# Patient Record
Sex: MALE | Race: WHITE | Employment: UNEMPLOYED | URBAN - METROPOLITAN AREA
[De-identification: names, ages, dates, MRNs, and addresses within clinical notes are randomized per-mention and may not be internally consistent; named-entity substitution may affect disease eponyms.]

---

## 2022-08-09 ENCOUNTER — APPOINTMENT (OUTPATIENT)
Dept: CT IMAGING | Age: 37
End: 2022-08-09
Payer: MEDICAID

## 2022-08-09 ENCOUNTER — HOSPITAL ENCOUNTER (EMERGENCY)
Age: 37
Discharge: HOME | End: 2022-08-09
Payer: MEDICAID

## 2022-08-09 VITALS
SYSTOLIC BLOOD PRESSURE: 130 MMHG | BODY MASS INDEX: 21.77 KG/M2 | HEIGHT: 71 IN | TEMPERATURE: 98.6 F | WEIGHT: 155.5 LBS | OXYGEN SATURATION: 96 % | HEART RATE: 75 BPM | DIASTOLIC BLOOD PRESSURE: 85 MMHG | RESPIRATION RATE: 20 BRPM

## 2022-08-09 DIAGNOSIS — K62.89 MASS OF ANUS: Primary | ICD-10-CM

## 2022-08-09 DIAGNOSIS — K62.89 RECTAL PAIN: ICD-10-CM

## 2022-08-09 LAB
ALBUMIN SERPL-MCNC: 5 GM/DL (ref 3.4–5)
ALP BLD-CCNC: 85 IU/L (ref 40–129)
ALT SERPL-CCNC: 12 U/L (ref 10–40)
ANION GAP SERPL CALCULATED.3IONS-SCNC: 14 MMOL/L (ref 4–16)
AST SERPL-CCNC: 26 IU/L (ref 15–37)
BACTERIA: NEGATIVE /HPF
BASOPHILS ABSOLUTE: 0 K/CU MM
BASOPHILS RELATIVE PERCENT: 0.3 % (ref 0–1)
BILIRUB SERPL-MCNC: 0.5 MG/DL (ref 0–1)
BILIRUBIN URINE: NEGATIVE MG/DL
BLOOD, URINE: NEGATIVE
BUN BLDV-MCNC: 14 MG/DL (ref 6–23)
CALCIUM SERPL-MCNC: 9.4 MG/DL (ref 8.3–10.6)
CHLORIDE BLD-SCNC: 102 MMOL/L (ref 99–110)
CLARITY: CLEAR
CO2: 23 MMOL/L (ref 21–32)
COLOR: YELLOW
CREAT SERPL-MCNC: 1 MG/DL (ref 0.9–1.3)
DIFFERENTIAL TYPE: ABNORMAL
EOSINOPHILS ABSOLUTE: 0.2 K/CU MM
EOSINOPHILS RELATIVE PERCENT: 1.6 % (ref 0–3)
GFR AFRICAN AMERICAN: >60 ML/MIN/1.73M2
GFR NON-AFRICAN AMERICAN: >60 ML/MIN/1.73M2
GLUCOSE BLD-MCNC: 103 MG/DL (ref 70–99)
GLUCOSE, URINE: NEGATIVE MG/DL
HCT VFR BLD CALC: 50.4 % (ref 42–52)
HEMOGLOBIN: 16.9 GM/DL (ref 13.5–18)
IMMATURE NEUTROPHIL %: 0.2 % (ref 0–0.43)
KETONES, URINE: NEGATIVE MG/DL
LEUKOCYTE ESTERASE, URINE: NEGATIVE
LIPASE: 19 IU/L (ref 13–60)
LYMPHOCYTES ABSOLUTE: 2 K/CU MM
LYMPHOCYTES RELATIVE PERCENT: 20.6 % (ref 24–44)
MCH RBC QN AUTO: 29.6 PG (ref 27–31)
MCHC RBC AUTO-ENTMCNC: 33.5 % (ref 32–36)
MCV RBC AUTO: 88.4 FL (ref 78–100)
MONOCYTES ABSOLUTE: 1 K/CU MM
MONOCYTES RELATIVE PERCENT: 10.9 % (ref 0–4)
MUCUS: ABNORMAL HPF
NITRITE URINE, QUANTITATIVE: NEGATIVE
NUCLEATED RBC %: 0 %
PDW BLD-RTO: 12.7 % (ref 11.7–14.9)
PH, URINE: 5.5 (ref 5–8)
PLATELET # BLD: 283 K/CU MM (ref 140–440)
PMV BLD AUTO: 9.2 FL (ref 7.5–11.1)
POTASSIUM SERPL-SCNC: 4.5 MMOL/L (ref 3.5–5.1)
PROTEIN UA: NEGATIVE MG/DL
RBC # BLD: 5.7 M/CU MM (ref 4.6–6.2)
RBC URINE: <1 /HPF (ref 0–3)
SEGMENTED NEUTROPHILS ABSOLUTE COUNT: 6.3 K/CU MM
SEGMENTED NEUTROPHILS RELATIVE PERCENT: 66.4 % (ref 36–66)
SODIUM BLD-SCNC: 139 MMOL/L (ref 135–145)
SPECIFIC GRAVITY UA: 1.02 (ref 1–1.03)
SQUAMOUS EPITHELIAL: <1 /HPF
TOTAL IMMATURE NEUTOROPHIL: 0.02 K/CU MM
TOTAL NUCLEATED RBC: 0 K/CU MM
TOTAL PROTEIN: 8.3 GM/DL (ref 6.4–8.2)
TRICHOMONAS: ABNORMAL /HPF
UROBILINOGEN, URINE: 0.2 MG/DL (ref 0.2–1)
WBC # BLD: 9.5 K/CU MM (ref 4–10.5)
WBC UA: 1 /HPF (ref 0–2)

## 2022-08-09 PROCEDURE — 85025 COMPLETE CBC W/AUTO DIFF WBC: CPT

## 2022-08-09 PROCEDURE — 80053 COMPREHEN METABOLIC PANEL: CPT

## 2022-08-09 PROCEDURE — 6360000004 HC RX CONTRAST MEDICATION: Performed by: PHYSICIAN ASSISTANT

## 2022-08-09 PROCEDURE — 74177 CT ABD & PELVIS W/CONTRAST: CPT

## 2022-08-09 PROCEDURE — 83690 ASSAY OF LIPASE: CPT

## 2022-08-09 PROCEDURE — 81001 URINALYSIS AUTO W/SCOPE: CPT

## 2022-08-09 PROCEDURE — 99285 EMERGENCY DEPT VISIT HI MDM: CPT

## 2022-08-09 RX ORDER — AMOXICILLIN 250 MG
1 CAPSULE ORAL DAILY
Qty: 30 TABLET | Refills: 0 | Status: SHIPPED | OUTPATIENT
Start: 2022-08-09

## 2022-08-09 RX ORDER — HYDROCORTISONE 25 MG/G
CREAM TOPICAL
Qty: 1 EACH | Refills: 0 | Status: SHIPPED | OUTPATIENT
Start: 2022-08-09

## 2022-08-09 RX ADMIN — IOPAMIDOL 75 ML: 755 INJECTION, SOLUTION INTRAVENOUS at 16:50

## 2022-08-09 ASSESSMENT — PAIN SCALES - GENERAL: PAINLEVEL_OUTOF10: 10

## 2022-08-09 ASSESSMENT — PAIN DESCRIPTION - PAIN TYPE: TYPE: ACUTE PAIN

## 2022-08-09 ASSESSMENT — PAIN DESCRIPTION - LOCATION: LOCATION: RECTUM

## 2022-08-09 ASSESSMENT — PAIN DESCRIPTION - DESCRIPTORS: DESCRIPTORS: SHARP;SHOOTING;THROBBING

## 2022-08-09 ASSESSMENT — PAIN DESCRIPTION - FREQUENCY: FREQUENCY: CONTINUOUS

## 2022-08-09 NOTE — ED PROVIDER NOTES
8/9/2022 4:07 PM EDT  Vera La was checked out to me by Steele Carrel PA-C. Please see his/her initial documentation for details of the patient's ED presentation, physical exam and completed studies. In brief, Vera La presented for rectal pain/mass. Per initial ED provider, patient is asymptomatic for the last 4 to 5 days, unable to sit secondary to pain. No previous history of hemorrhoids, continues to have bowel movements but with discomfort. Denying any bleeding from the rectum. Does state that he was recent incarcerated but denies any history of anal sexual intercourse. No history of diabetes. Has not noted any fever or chills. No scrotal or testicular pain or swelling. No urinary complaints. Rectal exam was performed by initial ED provider which did show a large rectal mass from the anal area without evidence of prolapse, is exquisitely tender without active bleeding. Focused physical exam: Nontoxic afebrile, ambulating and eating without difficulty. On chaperoned rectal exam, there is a large raised nearly circumferential erythematous lesion to the outer anus, exquisitely tender without active bleeding or point drainage. Areas appear flesh-colored pedunculated along the margin with more ulcerative central appearance. No palpable crepitus. No involvement of the gluteal cleft, perineum or scrotal area. No visible clot or thrombosis to the lesion.     I have reviewed and interpreted all of the currently available lab results from this visit (if applicable):  Results for orders placed or performed during the hospital encounter of 08/09/22   CBC with Auto Differential   Result Value Ref Range    WBC 9.5 4.0 - 10.5 K/CU MM    RBC 5.70 4.6 - 6.2 M/CU MM    Hemoglobin 16.9 13.5 - 18.0 GM/DL    Hematocrit 50.4 42 - 52 %    MCV 88.4 78 - 100 FL    MCH 29.6 27 - 31 PG    MCHC 33.5 32.0 - 36.0 %    RDW 12.7 11.7 - 14.9 %    Platelets 998 616 - 811 K/CU MM    MPV 9.2 7.5 - 11.1 FL Differential Type AUTOMATED DIFFERENTIAL     Segs Relative 66.4 (H) 36 - 66 %    Lymphocytes % 20.6 (L) 24 - 44 %    Monocytes % 10.9 (H) 0 - 4 %    Eosinophils % 1.6 0 - 3 %    Basophils % 0.3 0 - 1 %    Segs Absolute 6.3 K/CU MM    Lymphocytes Absolute 2.0 K/CU MM    Monocytes Absolute 1.0 K/CU MM    Eosinophils Absolute 0.2 K/CU MM    Basophils Absolute 0.0 K/CU MM    Nucleated RBC % 0.0 %    Total Nucleated RBC 0.0 K/CU MM    Total Immature Neutrophil 0.02 K/CU MM    Immature Neutrophil % 0.2 0 - 0.43 %   Comprehensive Metabolic Panel   Result Value Ref Range    Sodium 139 135 - 145 MMOL/L    Potassium 4.5 3.5 - 5.1 MMOL/L    Chloride 102 99 - 110 mMol/L    CO2 23 21 - 32 MMOL/L    BUN 14 6 - 23 MG/DL    Creatinine 1.0 0.9 - 1.3 MG/DL    Glucose 103 (H) 70 - 99 MG/DL    Calcium 9.4 8.3 - 10.6 MG/DL    Albumin 5.0 3.4 - 5.0 GM/DL    Total Protein 8.3 (H) 6.4 - 8.2 GM/DL    Total Bilirubin 0.5 0.0 - 1.0 MG/DL    ALT 12 10 - 40 U/L    AST 26 15 - 37 IU/L    Alkaline Phosphatase 85 40 - 129 IU/L    GFR Non-African American >60 >60 mL/min/1.73m2    GFR African American >60 >60 mL/min/1.73m2    Anion Gap 14 4 - 16   Lipase   Result Value Ref Range    Lipase 19 13 - 60 IU/L   Urinalysis with Microscopic   Result Value Ref Range    Color, UA YELLOW YELLOW    Clarity, UA CLEAR CLEAR    Glucose, Urine NEGATIVE NEGATIVE MG/DL    Bilirubin Urine NEGATIVE NEGATIVE MG/DL    Ketones, Urine NEGATIVE NEGATIVE MG/DL    Specific Gravity, UA 1.025 1.001 - 1.035    Blood, Urine NEGATIVE NEGATIVE    pH, Urine 5.5 5.0 - 8.0    Protein, UA NEGATIVE NEGATIVE MG/DL    Urobilinogen, Urine 0.2 0.2 - 1.0 MG/DL    Nitrite Urine, Quantitative NEGATIVE NEGATIVE    Leukocyte Esterase, Urine NEGATIVE NEGATIVE    RBC, UA <1 0 - 3 /HPF    WBC, UA 1 0 - 2 /HPF    Bacteria, UA NEGATIVE NEGATIVE /HPF    Squam Epithel, UA <1 /HPF    Mucus, UA MODERATE (A) NEGATIVE HPF    Trichomonas, UA NONE SEEN NONE SEEN /HPF       LABS:  Results for orders placed or performed during the hospital encounter of 08/09/22   CBC with Auto Differential   Result Value Ref Range    WBC 9.5 4.0 - 10.5 K/CU MM    RBC 5.70 4.6 - 6.2 M/CU MM    Hemoglobin 16.9 13.5 - 18.0 GM/DL    Hematocrit 50.4 42 - 52 %    MCV 88.4 78 - 100 FL    MCH 29.6 27 - 31 PG    MCHC 33.5 32.0 - 36.0 %    RDW 12.7 11.7 - 14.9 %    Platelets 942 337 - 255 K/CU MM    MPV 9.2 7.5 - 11.1 FL    Differential Type AUTOMATED DIFFERENTIAL     Segs Relative 66.4 (H) 36 - 66 %    Lymphocytes % 20.6 (L) 24 - 44 %    Monocytes % 10.9 (H) 0 - 4 %    Eosinophils % 1.6 0 - 3 %    Basophils % 0.3 0 - 1 %    Segs Absolute 6.3 K/CU MM    Lymphocytes Absolute 2.0 K/CU MM    Monocytes Absolute 1.0 K/CU MM    Eosinophils Absolute 0.2 K/CU MM    Basophils Absolute 0.0 K/CU MM    Nucleated RBC % 0.0 %    Total Nucleated RBC 0.0 K/CU MM    Total Immature Neutrophil 0.02 K/CU MM    Immature Neutrophil % 0.2 0 - 0.43 %   Comprehensive Metabolic Panel   Result Value Ref Range    Sodium 139 135 - 145 MMOL/L    Potassium 4.5 3.5 - 5.1 MMOL/L    Chloride 102 99 - 110 mMol/L    CO2 23 21 - 32 MMOL/L    BUN 14 6 - 23 MG/DL    Creatinine 1.0 0.9 - 1.3 MG/DL    Glucose 103 (H) 70 - 99 MG/DL    Calcium 9.4 8.3 - 10.6 MG/DL    Albumin 5.0 3.4 - 5.0 GM/DL    Total Protein 8.3 (H) 6.4 - 8.2 GM/DL    Total Bilirubin 0.5 0.0 - 1.0 MG/DL    ALT 12 10 - 40 U/L    AST 26 15 - 37 IU/L    Alkaline Phosphatase 85 40 - 129 IU/L    GFR Non-African American >60 >60 mL/min/1.73m2    GFR African American >60 >60 mL/min/1.73m2    Anion Gap 14 4 - 16   Lipase   Result Value Ref Range    Lipase 19 13 - 60 IU/L   Urinalysis with Microscopic   Result Value Ref Range    Color, UA YELLOW YELLOW    Clarity, UA CLEAR CLEAR    Glucose, Urine NEGATIVE NEGATIVE MG/DL    Bilirubin Urine NEGATIVE NEGATIVE MG/DL    Ketones, Urine NEGATIVE NEGATIVE MG/DL    Specific Gravity, UA 1.025 1.001 - 1.035    Blood, Urine NEGATIVE NEGATIVE    pH, Urine 5.5 5.0 - 8.0    Protein, UA NEGATIVE NEGATIVE MG/DL    Urobilinogen, Urine 0.2 0.2 - 1.0 MG/DL    Nitrite Urine, Quantitative NEGATIVE NEGATIVE    Leukocyte Esterase, Urine NEGATIVE NEGATIVE    RBC, UA <1 0 - 3 /HPF    WBC, UA 1 0 - 2 /HPF    Bacteria, UA NEGATIVE NEGATIVE /HPF    Squam Epithel, UA <1 /HPF    Mucus, UA MODERATE (A) NEGATIVE HPF    Trichomonas, UA NONE SEEN NONE SEEN /HPF         IMAGING:          CT ABDOMEN PELVIS W IV CONTRAST Additional Contrast? None (Final result)  Result time 08/09/22 17:24:50  Final result by Chevy Henson MD (08/09/22 17:24:50)                Impression:    No acute abnormality in the abdomen or pelvis. Narrative:    EXAMINATION:   CT OF THE ABDOMEN AND PELVIS WITH CONTRAST 8/9/2022 4:49 pm     TECHNIQUE:   CT of the abdomen and pelvis was performed with the administration of   intravenous contrast. Multiplanar reformatted images are provided for review. Automated exposure control, iterative reconstruction, and/or weight based   adjustment of the mA/kV was utilized to reduce the radiation dose to as low   as reasonably achievable. COMPARISON:   None. HISTORY:   ORDERING SYSTEM PROVIDED HISTORY: Abdominal pain, rectal mass. TECHNOLOGIST PROVIDED HISTORY:   Reason for exam:->Abdominal pain, rectal mass. Additional Contrast?->None   Decision Support Exception - unselect if not a suspected or confirmed   emergency medical condition->Emergency Medical Condition (MA)   Reason for Exam: Abdominal pain, rectal mass. FINDINGS:   Lower Chest: Limited images through the lung bases are unremarkable. Organs: The liver and gallbladder are unremarkable. No biliary ductal   dilatation is identified. The pancreas, spleen, and bilateral adrenal glands   are unremarkable. The bilateral kidneys and ureters are unremarkable. GI/Bowel: Normal appendix. The colon is unremarkable. The stomach and small   bowel are normal in appearance. No obstruction or wall thickening identified. Pelvis: The urinary bladder is normal in appearance. The prostate gland is   normal in appearance. No free fluid. No pelvic or inguinal lymphadenopathy. Peritoneum/Retroperitoneum: The abdominal aorta is normal in appearance. No   retroperitoneal or mesenteric lymphadenopathy is identified. No free air or   fluid is seen in the abdomen. Bones/Soft Tissues: Bilateral sacroiliitis. Chronic bilateral L5 pars   defects with grade 1 anterolisthesis of L5 on S1 and moderate L5-S1   degenerative disc disease. No acute osseous or soft tissue abnormality. FINAL IMPRESSION:  1. Mass of anus    2. Rectal pain        Patient was signed out to me by colleague, Lula Martinez PA-C, pending lab results, CT imaging and anticipated discharge home with symptomatic care and general surgery follow-up. Please see his/her note for complete HPI/physical exam and initial interpretation of completed labs/imaging studies. Labs are reassuring. Normal white count hemoglobin. CMP without significant derangement. Lipase within normal range. CT abdomen pelvis with IV contrast is negative for evidence of acute abdominal pelvic process or evidence of perirectal/perianal abscess or cellulitis. On my chaperoned rectal exam, there is a large raised erythematous lesion nearly circumferential to the outer anal area. More pedunculated flesh-colored to the margins however central aspect does appear more erythematous and ulcerative. No active bleeding. Areas are exquisitely tender without fluctuance or crepitus. No involvement of perineum or gluteal cleft. At this time, unclear exact etiology for outer anal mass. Possibly a large external hemorrhoid that does not appear acutely thrombosed at this time. Also discussed possible other abnormal skin lesion including out of the warts but cannot completely rule out possible malignant lesion.   We discussed supportive symptomatic care, improved bowel regimen and close follow-up with general surgeon for further evaluation. Patient is comfortable agreeable this plan. No evidence of bacterial process requiring antibiotics and no palpable abscess requiring I&D at this time. Patient is discharged in stable condition with Steven Sheehan. Educate on sitz bath's. Educated to avoid prolonged sitting or straining on the toilet, increasing fiber intake. Patient does not have PCP so I have given him/her doctor of the day contact information and encourage him/her to establish care and followup in the next 1-2 days. Will also be given follow-up with on-call general surgeon. Return warnings discussed.         (Please note that portions of this note may have been completed with a voice recognition program. Efforts were made to edit the dictations but occasionally words are mis-transcribed.)       Jayden Busby PA-C  08/09/22 2006

## 2022-08-09 NOTE — ED NOTES
Pt notified of need for urine sample - states he just went to the bathroom right before I walked in. States he will try again in a minute to obtain sample.       Tate Stinson RN  08/09/22 [id number]

## 2022-08-09 NOTE — ED PROVIDER NOTES
[address] ENCOUNTER        Pt Name: Dustin Cooper  MRN: [id number]  [place] 1985  Date of evaluation: 8/9/2022  Provider: BERT Jacobson  PCP: No primary care provider on file. JOMAR. I have evaluated this patient. My supervising physician was available for consultation. Triage CHIEF COMPLAINT       Chief Complaint   Patient presents with    Hemorrhoids     States he has had hemorrhoid pain for the last 5 days         HISTORY OF PRESENT ILLNESS      Chief Complaint: Hemorrhoids/rectal pain    Dustni Cooper is a 39 y.o. male who presents to the emergency department today with a rectal mass/rectal pain. It is developed over the last 4 to 5 days. States he got to the point where he is unable to sit. He states he is never had history of significant hemorrhoids. He is concerned that there may be something else going on. He states is exquisitely tender. He is having bowel movements but very minimal.  He says he has occasional upper abdominal pain. He has no obvious bleeding from the rectum. He has no other significant medical history. States he has been incarcerated, he denies history of anal sexual intercourse. No other significant surgical and/or medical issues. Unclear of his family history    Nursing Notes were all reviewed and agreed with or any disagreements were addressed in the HPI.     REVIEW OF SYSTEMS     Constitutional:   Denies fever, chills, weight loss or weakness   HENT:   Denies sore throat or ear pain   Cardiovascular:   Denies chest pain, palpitations   Respiratory:  Denies cough or shortness of breath    GI: See HPI  :  Denies any urinary symptoms   Musculoskeletal:   Denies back pain  Skin:   Denies rash  Neurologic:   Denies headache, focal weakness or sensory changes   Endocrine:  Denies polyuria or polydypsia   Lymphatic:  Denies swollen glands     PAST MEDICAL intact. Normal gross motor coordination & motor strength bilateral upper and lower extremities  Sensation intact. Psychiatric:  Affect normal, Mood normal.     DIAGNOSTIC RESULTS   LABS:    Labs Reviewed   CBC WITH AUTO DIFFERENTIAL   COMPREHENSIVE METABOLIC PANEL   LIPASE   URINALYSIS WITH MICROSCOPIC       When ordered, only abnormal lab results are displayed. All other labs were within normal range or not returned as of this dictation. EKG: When ordered, EKG's are interpreted by the Emergency Department Physician in the absence of a cardiologist.  Please see their note for interpretation of EKG. RADIOLOGY:   Non-plain film images such as CT, Ultrasound and MRI are read by the radiologist. Plain radiographic images are visualized and preliminarily interpreted by the  ED Provider with the below findings:    Interpretation perthe Radiologist below, if available at the time of this note:    CT ABDOMEN PELVIS W IV CONTRAST Additional Contrast? None    (Results Pending)     No results found. PROCEDURES   Unless otherwise noted below, none       CRITICAL CARE   CRITICAL CARE NOTE:   N/A    CONSULTS:  None      EMERGENCY DEPARTMENT COURSE and MDM:   Vitals:    Vitals:    08/09/22 1436   BP: (!) 132/100   Pulse: 82   Resp: 16   Temp: 98.6 °F (37 °C)   TempSrc: Oral   Weight: 155 lb 8 oz (70.5 kg)   Height: 5' 11\" (1.803 m)       Patient was given thefollowing medications:  Medications - No data to display      Is this patient to be included in the SEP-1 Core Measure due to severe sepsis or septic shock? No   Exclusion criteria - the patient is NOT to be included for SEP-1 Core Measure due to: Infection is not suspected    MDM:  Patient presents as above. Emergent etiologies considered. Patient seen and examined. Work-up initiated secondary to presentation, physical exam findings, vital signs and medical chart review.     In brief, 51-year-old male presenting to the emergency department today with a rectal mass/rectal pain. Thinks that it may be hemorrhoids but he is unsure. He feels like there something else going on. He is having no obvious bleeding, he is exquisitely tender. No other rash. Having trouble with bowel movements. On physical exam there is an obvious signs of significant rectal hemorrhoids versus mass. Is exquisitely tender, no obvious signs of bleeding, perineum demonstrating no erythema or signs of infection. Patient adamant that he feels like there is something else going on, at this time we will obtain some basic labs, CT imaging of the abdomen pelvis to further evaluate this possible rectal mass. ________________________________________________________________________    4:02 PM EDT I have signed out [facility] Emergency Department care to RENA MICHAEL. Bedside hand-off performed. We discussed the history, physical exam, completed/pending test results (if obtained) and current treatment plan. At time of patient signout labs, CT abdomen pelvis with contrast and final dispositionpending.   - If  negative for abnormality or obvious etiology, patient will be discharged with close outpatient follow-up and return to emergency department warning signs.   - If Imaging, labs  reveals any abnormalities, these will be addressed by BERT Anderson and supervising M.D. In this case, see his/her note for further details, remaining Emergency Department course, final disposition and diagnosis. ________________________________________________________________________         (Please note that portions ofthis note were completed with a voice recognition program.  Efforts were made to edit the dictations but occasionally words are mis-transcribed. )    BERT Barraza Asa (electronically signed)            BERT Michelle  08/09/22 [id number]